# Patient Record
Sex: FEMALE | Race: WHITE | Employment: FULL TIME | URBAN - METROPOLITAN AREA
[De-identification: names, ages, dates, MRNs, and addresses within clinical notes are randomized per-mention and may not be internally consistent; named-entity substitution may affect disease eponyms.]

---

## 2021-05-14 ENCOUNTER — TELEPHONE (OUTPATIENT)
Dept: NEUROLOGY | Age: 26
End: 2021-05-14

## 2021-05-14 NOTE — TELEPHONE ENCOUNTER
----- Message from Cinthya Padgett sent at 5/14/2021  9:57 AM EDT -----  Regarding: Dr. Rosa Doherty first and last name: N/A  Reason for call:  To become a np/ Testing   Best contact number(s): [phone]  Details to clarify the request: Pt stated that she needs a call back to schedule a nerve test. Pt will also be a np and needs to speak with a nurse as a soon as possible

## 2021-05-17 ENCOUNTER — OFFICE VISIT (OUTPATIENT)
Dept: NEUROLOGY | Age: 26
End: 2021-05-17
Payer: MEDICAID

## 2021-05-17 DIAGNOSIS — R20.0 NUMBNESS OF RIGHT HAND: Primary | ICD-10-CM

## 2021-05-17 PROCEDURE — 95909 NRV CNDJ TST 5-6 STUDIES: CPT | Performed by: PSYCHIATRY & NEUROLOGY

## 2021-05-17 PROCEDURE — 95886 MUSC TEST DONE W/N TEST COMP: CPT | Performed by: PSYCHIATRY & NEUROLOGY

## 2021-05-17 NOTE — PROCEDURES
EMG/ NCS Report  [facility] - INPATIENT  [address] [facility] [address]   Ph: [phone]   FAX: [phone]/ [phone]    Test Date:  2021    Patient: Herbie Lawton : 1995 Physician: Steve Guerra M.D. Sex: Female Height: ' \" Ref Phys: Dr. Lucía Rodríguez   ID#: [id number] Weight:  lbs. Technician: Madhavi Grimaldo     Patient History:  CC: 2 week hx of numbness/ pain in 1st 3 fingers right hand and fingers 2-3 with abnormal position (claw-like). EMG & NCV Findings:  Evaluation of the right median motor nerve showed prolonged distal onset latency (5.5 ms), reduced amplitude (2.9 mV), and normal conduction velocity (Elbow-Wrist, 73 m/s). The right ulnar motor nerve showed normal distal onset latency (2.5 ms), normal amplitude (9.0 mV), normal conduction velocity (B Elbow-Wrist, 72 m/s), and normal conduction velocity (A Elbow-B Elbow, 71 m/s). The right median sensory nerve showed no response (Wrist). The right radial sensory and the right ulnar sensory nerves showed normal distal peak latency (R1.8, R2.8 ms) and normal amplitude (R46.3, R27.1 µV). All F Wave latencies were within normal limits. All examined muscles (as indicated in the following table) showed no evidence of electrical instability.         Impression:    Moderate right median neuropathy     No electrodiagnostic finding of right cervical radiculopathy, right ulnar neuropathy    ___________________________  Steve Guerra M.D.      Nerve Conduction Studies  Anti Sensory Summary Table     Stim Site NR Peak (ms) Norm Peak (ms) P-T Amp (µV) Norm P-T Amp Site1 Site2 Dist (cm)   Right Median Anti Sensory (2nd Digit)  31°C   Wrist NR  <4  >13 Wrist 2nd Digit 14.0   Right Radial Anti Sensory (Base 1st Digit)  31.7°C   Wrist    1.8 <2.8 46.3 >11 Wrist Base 1st Digit 10.0   Site 2    1.8  46.5       Right Ulnar Anti Sensory (5th Digit)  31.5°C   Wrist    2.8 <4.0 27.1 >9 Wrist 5th Digit 14.0   B Elbow    2.9  25.1  B Elbow Wrist 0.0     Motor Summary Table     Stim Site NR Onset (ms) Norm Onset (ms) O-P Amp (mV) Norm O-P Amp Amp (Prev) (%) Site1 Site2 Dist (cm) Leroy (m/s) Norm Leroy (m/s)   Right Median Motor (Abd Poll Brev)  31.7°C   Wrist    5.5 <4.5 2.9 >4.1 100.0 Wrist Abd Poll Brev 8.0     Elbow    7.9  3.2  110.3 Elbow Wrist 17.5 73 >49   Right Ulnar Motor (Abd Dig Minimi)  31.5°C   Wrist    2.5 <3.7 9.0 >7.9 100.0 Wrist Abd Dig Minimi 8.0     B Elbow    5.2  8.8  97.8 B Elbow Wrist 19.5 72 >52   A Elbow    6.6  8.9  101.1 A Elbow B Elbow 10.0 71 >43     F Wave Studies     NR F-Lat (ms) Lat Norm (ms) L-R F-Lat (ms) L-R Lat Norm   Right Ulnar (Mrkrs) (Abd Dig Min)  31.5°C      23.46 <32  <2.5     EMG     Side Muscle Nerve Root Ins Act Fibs Psw Recrt Duration Amp Poly Comment   Right Abd Poll Brev Median C8-T1 Nml Nml Nml Nml Nml Nml Nml    Right 1stDorInt Ulnar C8-T1 Nml Nml Nml Nml Nml Nml Nml    Right ExtIndicis Radial (Post Int) C7-8 Nml Nml Nml Nml Nml Nml Nml    Right PronatorTeres Median C6-7 Nml Nml Nml Nml Nml Nml Nml    Right Triceps Radial C6-7-8 Nml Nml Nml Nml Nml Nml Nml    Right Deltoid Axillary C5-6 Nml Nml Nml Nml Nml Nml Nml    Right Mid Cerv Parasp Rami C5,6 Nml Nml Nml Nml Nml Nml Nml    Right Lower Cerv Parasp Rami C7,T1 Nml Nml Nml Nml Nml Nml Nml      Waveforms:

## 2021-05-18 ENCOUNTER — TELEPHONE (OUTPATIENT)
Dept: NEUROLOGY | Age: 26
End: 2021-05-18

## 2021-05-18 NOTE — TELEPHONE ENCOUNTER
----- Message from Carey Simmons sent at 5/17/2021  4:19 PM EDT -----  Regarding: [employer]/Telephone  Caller's first and last name: [facility] from Dr. Lyon Many office     Reason for call: EMG results    Callback required yes/no and why: yes, to discuss    Best contact number(s): [phone]    Details to clarify the request: Needs EMG results faxed over